# Patient Record
Sex: MALE | Race: WHITE | URBAN - METROPOLITAN AREA
[De-identification: names, ages, dates, MRNs, and addresses within clinical notes are randomized per-mention and may not be internally consistent; named-entity substitution may affect disease eponyms.]

---

## 2019-03-20 ENCOUNTER — OFFICE VISIT (OUTPATIENT)
Dept: FAMILY MEDICINE | Facility: OTHER | Age: 34
End: 2019-03-20
Payer: COMMERCIAL

## 2019-03-20 VITALS
RESPIRATION RATE: 16 BRPM | HEART RATE: 88 BPM | DIASTOLIC BLOOD PRESSURE: 68 MMHG | BODY MASS INDEX: 27.28 KG/M2 | HEIGHT: 68 IN | WEIGHT: 180 LBS | TEMPERATURE: 98.8 F | SYSTOLIC BLOOD PRESSURE: 124 MMHG | OXYGEN SATURATION: 98 %

## 2019-03-20 DIAGNOSIS — M54.42 ACUTE MIDLINE LOW BACK PAIN WITH LEFT-SIDED SCIATICA: Primary | ICD-10-CM

## 2019-03-20 DIAGNOSIS — N20.0 RECURRENT KIDNEY STONES: ICD-10-CM

## 2019-03-20 PROCEDURE — 99214 OFFICE O/P EST MOD 30 MIN: CPT | Performed by: FAMILY MEDICINE

## 2019-03-20 RX ORDER — IBUPROFEN 200 MG
200 TABLET ORAL EVERY 4 HOURS PRN
COMMUNITY

## 2019-03-20 SDOH — HEALTH STABILITY: MENTAL HEALTH: HOW OFTEN DO YOU HAVE A DRINK CONTAINING ALCOHOL?: MONTHLY OR LESS

## 2019-03-20 SDOH — HEALTH STABILITY: MENTAL HEALTH: HOW MANY STANDARD DRINKS CONTAINING ALCOHOL DO YOU HAVE ON A TYPICAL DAY?: 3 OR 4

## 2019-03-20 SDOH — HEALTH STABILITY: MENTAL HEALTH: HOW OFTEN DO YOU HAVE 6 OR MORE DRINKS ON ONE OCCASION?: NEVER

## 2019-03-20 ASSESSMENT — MIFFLIN-ST. JEOR: SCORE: 1725.97

## 2019-03-20 NOTE — PATIENT INSTRUCTIONS
Can also try massage, rolling the area. Ice for first 48 hours, then use heat. Physical therapy was ordered, might take a while to get in to see them.     Patient Education     Back Exercises: Side Stretch    To start, sit in a chair with your feet flat on the floor. Shift your weight slightly forward to avoid rounding your back. Relax. Keep your ears, shoulders, and hips aligned:    Stretch your right arm overhead.    Slowly bend to the left. Don t twist your torso. Stay within your pain limits.    Hold for 20 seconds. Return to starting position.    Repeat 2 to 5 times. Then, switch to the other side.  Date Last Reviewed: 3/1/2018    4843-2491 [employer]. [address] [place] [address], [place] [address]. All rights reserved. This information is not intended as a substitute for professional medical care. Always follow your healthcare professional's instructions.

## 2019-03-20 NOTE — PROGRESS NOTES
SUBJECTIVE:   Tc Velasquez is a 33 year old male who presents to clinic today for the following health issues:    HPI     Back Pain       Duration: 2 days        Specific cause: none    Description:   Location of pain: middle of back left and upper back left  Character of pain: sharp and constant  Pain radiation:none  New numbness or weakness in legs, not attributed to pain:  no     Intensity: Currently 6/10    History:   Pain interferes with job: YES,   History of back problems: no prior back problems  Any previous MRI or X-rays: Yes--at [facility].  Date 3/2018  Sees a specialist for back pain:  No  Therapies tried without relief: lying flat    Alleviating factors:   Improved by: No      Precipitating factors:  Worsened by: Bending    His pain today is in his mid thoracic back, radiating around in to the ribs. No mechanism of injury.   He has a history of recurrent kidney stones since he was 16. Has seen a urologist in the past for those. In the past he has had muscle spasms in the upper back for which he was treated, but that resolved.       Accompanying Signs & Symptoms:  Risk of Fracture:  None  Risk of Cauda Equina:  None  Risk of Infection:  None  Risk of Cancer:  None  Risk of Ankylosing Spondylitis:  Onset at age <35, male, AND morning back stiffness. no       Problem list and histories reviewed & adjusted, as indicated.  Additional history: as documented    Patient Active Problem List   Diagnosis     Recurrent kidney stones     No past surgical history on file.    Social History     Tobacco Use     Smoking status: Current Every Day Smoker     Packs/day: 0.50     Types: Cigarettes     Smokeless tobacco: Never Used   Substance Use Topics     Alcohol use: Yes     Frequency: Monthly or less     Drinks per session: 3 or 4     Binge frequency: Never     No family history on file.      Current Outpatient Medications   Medication Sig Dispense Refill     ibuprofen (ADVIL/MOTRIN) 200 MG tablet Take 200 mg by  "mouth every 4 hours as needed for mild pain         ROS:  Constitutional, HEENT, cardiovascular, pulmonary, GI, , musculoskeletal, neuro, skin, endocrine and psych systems are negative, except as in HPI or otherwise noted.     This document serves as a record of the services and decisions personally performed and made by Katy Escalante MD. It was created on her behalf by Keira Holland, a trained medical scribe. The creation of this document is based the provider's statements to the medical scribe.  Keira Holland, March 20, 2019 1:50 PM     OBJECTIVE:                                                    /68 (BP Location: Left arm, Patient Position: Chair, Cuff Size: Adult Large)   Pulse 88   Temp 98.8  F (37.1  C) (Temporal)   Resp 16   Ht 1.727 m (5' 8\")   Wt 81.6 kg (180 lb)   SpO2 98%   BMI 27.37 kg/m    Body mass index is 27.37 kg/m .   GENERAL: healthy, alert, well nourished, well hydrated, no distress  MS: extremities- no gross deformities noted, no edema  SKIN: no suspicious lesions, no rashes  PSYCH: Alert and oriented times 3; speech- coherent , normal rate and volume; able to articulate logical thoughts, able to abstract reason, no tangential thoughts, no hallucinations or delusions, affect- normal    Back Exam     Tenderness   The patient is experiencing tenderness in the thoracic (left).    Range of Motion   The patient has normal back ROM.    Reflexes   Patellar: normal    Other   Sensation: normal  Gait: normal   Erythema: no back redness  Scars: absent    Comments:  Pain is reproduced with right rotation. Pain localized to the area of the left latissimus muscle.        Diagnostic test results:  No results found for this or any previous visit (from the past 24 hour(s)).     ASSESSMENT/PLAN:                                                        ICD-10-CM    1. Acute midline low back pain with left-sided sciatica M54.42 [facility]   2. Recurrent kidney stones N20.0  "     Back Pain: Exam consistent with muscular strain/spasm. Physical therapy was ordered, instructed to begin home stretching regimen, and other home cares recommended until he is able to get in to physical therapy.    Elevated Blood Pressure: His blood pressure is above normal today, recommended that he start watching this at home, follow up if it continues to be elevated. Discussed lifestyle factors such as stress and caffeine that can cause increased blood pressure.     Patient Instructions   Can also try massage, rolling the area. Ice for first 48 hours, then use heat. Physical therapy was ordered, might take a while to get in to see them.     Patient Education     Back Exercises: Side Stretch    To start, sit in a chair with your feet flat on the floor. Shift your weight slightly forward to avoid rounding your back. Relax. Keep your ears, shoulders, and hips aligned:    Stretch your right arm overhead.    Slowly bend to the left. Don t twist your torso. Stay within your pain limits.    Hold for 20 seconds. Return to starting position.    Repeat 2 to 5 times. Then, switch to the other side.  Date Last Reviewed: 3/1/2018    [id number] [employer]. [address] [place] [address]. All rights reserved. This information is not intended as a substitute for professional medical care. Always follow your healthcare professional's instructions.           The information in this document, created by the medical scribe for me, accurately reflects the services I personally performed and the decisions made by me. I have reviewed and approved this document for accuracy.     Katy Escalante MD, MD  [facility]